# Patient Record
(demographics unavailable — no encounter records)

---

## 2025-02-25 NOTE — HISTORY OF PRESENT ILLNESS
[FreeTextEntry1] : ENRRIQUE [de-identified] : SOB with exertion.  send for cardio eval hx of IAN.  uses CPAP.

## 2025-02-25 NOTE — ASSESSMENT
[FreeTextEntry1] : , , Preventative: Counseled on health promotion and disease prevention. EKG and wellness labs done Discussed routine immunizations  Heart Screen:  EKG nsr  Exertional SOB: Cardio referral   Hx of Renal Cysts: Send for Repat SONO  3.1 cm left renal cyst    HLD: Counseled on diet, exercise and lifestyle modifications. Advised a diet centered around whole plant based foods.  Vegetables, fruits, legumes, grains, nuts.  Advised limiting intake of refined processed foods (refined white flour products, refined sugar, soda, juice).  Limit intake of meat, dairy, eggs, oil.    LFT Elevation: -Weight loss -Low fat diet -Avoidance of excessive alcohol, Acetaminophen and other hepatotoxic agents. -Check Viral labs depending on clinical course -RUQ SONO depending on clinical course -Vit / D calcium supplementation in setting of possible osteopenia / osteoporosis  IAN: on CPAP

## 2025-02-25 NOTE — HISTORY OF PRESENT ILLNESS
[FreeTextEntry1] : ENRRIQUE [de-identified] : SOB with exertion.  send for cardio eval hx of AIN.  uses CPAP.

## 2025-02-27 NOTE — HISTORY OF PRESENT ILLNESS
[FreeTextEntry1] : 42M h/o IAN/CPAP had recent annual physical with PCP reports of chronic exertional dyspnea for 2 years refer for cardiology evaluation.   Reports chronic dyspnea on exertion with walking 1 block for about 2 years, denies chest pain or palpitations, exercise mainly with weight training and no cardio, he was diagnosed with IAN in 8/2023 initially seen by ENT, no prior pulmonary evaluation, denies lung condition in the past  Recent lab A1c 5.4% , , HDL 42, LDL 95- nonfasting, often eats red meats about 3 days per week, drinks 2-3 protein shakes daily   No CAD/stroke in family  Nonsmoker, no alcohol use  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

## 2025-02-27 NOTE — DISCUSSION/SUMMARY
[EKG obtained to assist in diagnosis and management of assessed problem(s)] : EKG obtained to assist in diagnosis and management of assessed problem(s) [FreeTextEntry1] : 42M h/o IAN/CPAP had recent annual physical with PCP reports of chronic exertional dyspnea for 2 years refer for cardiology evaluation.  Chronic dyspnea on exertion >2yrs cardiac exam normal and serial repeat EKG normal as well, will obtain Echocardiogram to assess structural heart function and also need exercise treadmill stress test with pulse oximeter monitoring to assess cardiopulmonary etiology, if both results are normal and still symptomatic may need chest Xray and pulmonary evaluation for PFT.   Elevated TG nonfasting lab work recently will need repeat fasting lipid to exclude HTG, if still elevated with TG level >300s then need strict low fat diet and conside fish oil use. Advised to reduce protein shakes consumption.   IAN- on CPAP   Follow up in 3 months.

## 2025-02-27 NOTE — HISTORY OF PRESENT ILLNESS
[FreeTextEntry1] : 42M h/o IAN/CPAP had recent annual physical with PCP reports of chronic exertional dyspnea for 2 years refer for cardiology evaluation.   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

## 2025-05-28 NOTE — DISCUSSION/SUMMARY
[FreeTextEntry1] : 42M h/o IAN/CPAP had recent annual physical with PCP reports of chronic exertional dyspnea for 2 years refer for cardiology evaluation seen on 2/2025, Echo with LV EF 62%, EST for 11:41 minutes and repeat lipid panel with  and  recent repeat  and , returns for follow up.  Overall stable no recent exertional dyspnea defer chest imaging for now. Repeat EKG within normal.   Recurrent elevated TG level advised need strict low fat diet, LDL range not indicated for statin use yet as 10-yr ASCVD risk <7.5%, can consider CT-coronary calcium score in the future if remains uncontrolled despite dietary changes.   IAN- on CPAP   Follow up in 1 year or sooner if new cardiac issue arise [EKG obtained to assist in diagnosis and management of assessed problem(s)] : EKG obtained to assist in diagnosis and management of assessed problem(s)

## 2025-05-28 NOTE — CARDIOLOGY SUMMARY
[de-identified] : 2/27/25- Sinus 60, normal axis, no ST abnormality, QTc 394 5/28/25- Sinus 86, normal axis, no ST abnormality, QTc 398 [de-identified] : 4/2/25: EST 11:41 minutes [de-identified] : 4/2/25: L EF 62%, PASP 23, mild TR

## 2025-05-28 NOTE — CARDIOLOGY SUMMARY
[de-identified] : 2/27/25- Sinus 60, normal axis, no ST abnormality, QTc 394 5/28/25- Sinus 86, normal axis, no ST abnormality, QTc 398 [de-identified] : 4/2/25: EST 11:41 minutes [de-identified] : 4/2/25: L EF 62%, PASP 23, mild TR

## 2025-05-28 NOTE — HISTORY OF PRESENT ILLNESS
[FreeTextEntry1] : 42M h/o IAN/CPAP had recent annual physical with PCP reports of chronic exertional dyspnea for 2 years refer for cardiology evaluation seen on 2/2025, Echo with LV EF 62%, EST for 11:41 minutes and repeat lipid panel with  and  recent repeat  and , returns for follow up.  Seen by PCP yesterday with repeat lab work done, had reduced red meats intake, denies eating fried foods or excess oil, but eat potatoes chips and drinks whole drink for cereal and also eating Greek yogurt. Active with exercise without recent dyspnea, had wisdom tooth removed yesterday for infection and still on Amoxicillin for 10 days.    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

## 2025-05-28 NOTE — HISTORY OF PRESENT ILLNESS
[FreeTextEntry1] : 42M h/o IAN/CPAP had recent annual physical with PCP reports of chronic exertional dyspnea for 2 years refer for cardiology evaluation seen on 2/2025, Echo with LV EF 62%, EST for 11:41 minutes and repeat lipid panel with  and  recent repeat  and , returns for follow up.  Seen by PCP yesterday with repeat lab work done, had reduced red meats intake, denies eating fried foods or excess oil, but eat potatoes chips and drinks whole drink for cereal and also eating Greek yogurt. Active with exercise without recent dyspnea, had wisdom tooth removed yesterday for infection and still on Amoxicillin for 10 days.    Prior visit 2/2025:  Reports chronic dyspnea on exertion with walking 1 block for about 2 years, denies chest pain or palpitations, exercise mainly with weight training and no cardio, he was diagnosed with IAN in 8/2023 initially seen by ENT, no prior pulmonary evaluation, denies lung condition in the past  Recent lab A1c 5.4% , , HDL 42, LDL 95- nonfasting, often eats red meats about 3 days per week, drinks 2-3 protein shakes daily   No CAD/stroke in family  Nonsmoker, no alcohol use  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

## 2025-06-02 NOTE — ASSESSMENT
[FreeTextEntry1] : ,  Hx of Renal Cysts: Send for Repat SONO  3.1 cm left renal cyst    HLD: LDL = 162-->125 Counseled on diet, exercise and lifestyle modifications. Advised a diet centered around whole plant based foods.  Vegetables, fruits, legumes, grains, nuts.  Advised limiting intake of refined processed foods (refined white flour products, refined sugar, soda, juice).  Limit intake of meat, dairy, eggs, oil.    LFT Elevation: back in normal range  IAN: on CPAP  Exertional SOB: Evaluated by Cardio since last visit.  Pending further testing